# Patient Record
Sex: MALE | Race: BLACK OR AFRICAN AMERICAN | NOT HISPANIC OR LATINO | Employment: FULL TIME | URBAN - METROPOLITAN AREA
[De-identification: names, ages, dates, MRNs, and addresses within clinical notes are randomized per-mention and may not be internally consistent; named-entity substitution may affect disease eponyms.]

---

## 2020-10-06 ENCOUNTER — OFFICE VISIT (OUTPATIENT)
Dept: URGENT CARE | Facility: CLINIC | Age: 35
End: 2020-10-06
Payer: OTHER GOVERNMENT

## 2020-10-06 VITALS
HEIGHT: 71 IN | HEART RATE: 60 BPM | RESPIRATION RATE: 16 BRPM | TEMPERATURE: 97 F | OXYGEN SATURATION: 97 % | DIASTOLIC BLOOD PRESSURE: 73 MMHG | WEIGHT: 195 LBS | BODY MASS INDEX: 27.3 KG/M2 | SYSTOLIC BLOOD PRESSURE: 125 MMHG

## 2020-10-06 DIAGNOSIS — H10.502 BLEPHAROCONJUNCTIVITIS OF LEFT EYE, UNSPECIFIED BLEPHAROCONJUNCTIVITIS TYPE: Primary | ICD-10-CM

## 2020-10-06 PROCEDURE — 99204 PR OFFICE/OUTPT VISIT, NEW, LEVL IV, 45-59 MIN: ICD-10-PCS | Mod: S$GLB,,, | Performed by: NURSE PRACTITIONER

## 2020-10-06 PROCEDURE — 99204 OFFICE O/P NEW MOD 45 MIN: CPT | Mod: S$GLB,,, | Performed by: NURSE PRACTITIONER

## 2020-10-06 RX ORDER — POLYMYXIN B SULFATE AND TRIMETHOPRIM 1; 10000 MG/ML; [USP'U]/ML
1 SOLUTION OPHTHALMIC EVERY 6 HOURS
Qty: 10 ML | Refills: 0 | Status: SHIPPED | OUTPATIENT
Start: 2020-10-06 | End: 2020-10-06

## 2020-10-06 RX ORDER — POLYMYXIN B SULFATE AND TRIMETHOPRIM 1; 10000 MG/ML; [USP'U]/ML
1 SOLUTION OPHTHALMIC EVERY 6 HOURS
Qty: 10 ML | Refills: 0 | Status: SHIPPED | OUTPATIENT
Start: 2020-10-06 | End: 2020-10-13

## 2020-10-06 RX ORDER — OLOPATADINE HYDROCHLORIDE 1 MG/ML
1 SOLUTION/ DROPS OPHTHALMIC 2 TIMES DAILY
Qty: 5 ML | Refills: 1 | Status: SHIPPED | OUTPATIENT
Start: 2020-10-06 | End: 2021-10-06

## 2020-10-06 NOTE — PROGRESS NOTES
"Subjective:       Patient ID: Shekhar Casillas is a 35 y.o. male.    Vitals:  height is 5' 10.5" (1.791 m) and weight is 88.5 kg (195 lb). His oral temperature is 97.1 °F (36.2 °C). His blood pressure is 125/73 and his pulse is 60. His respiration is 16 and oxygen saturation is 97%.     Chief Complaint: Eye Pain    redness/swelling to top Lt eyelid X 1 wk    Eye Pain   The left eye is affected. This is a new problem. The current episode started in the past 7 days. There was no injury mechanism. He does not wear contacts. Pertinent negatives include no blurred vision, eye discharge, double vision, eye redness, fever, itching, nausea, photophobia or vomiting. He has tried eye drops for the symptoms.       Constitution: Negative for chills and fever.   HENT: Negative for congestion and sinus pain.    Eyes: Positive for eye pain and eyelid swelling. Negative for eye trauma, foreign body in eye, eye discharge, eye itching, eye redness, photophobia, vision loss, double vision and blurred vision.   Gastrointestinal: Negative for nausea and vomiting.   Skin: Negative for rash.   Allergic/Immunologic: Negative for seasonal allergies and itching.   Neurological: Negative for headaches.       Objective:      Physical Exam   Constitutional: He is oriented to person, place, and time. He appears well-developed.   HENT:   Head: Normocephalic and atraumatic.   Ears:   Right Ear: External ear normal.   Left Ear: External ear normal.   Nose: Nose normal.   Mouth/Throat: Oropharynx is clear and moist.   Eyes: Pupils are equal, round, and reactive to light. EOM and lids are normal. Lids are everted and swept, no foreign bodies found. Left eye exhibits chemosis. Left conjunctiva is injected.     vision grossly intactgaze aligned appropriately  Neck: Trachea normal, full passive range of motion without pain and phonation normal. Neck supple.   Musculoskeletal: Normal range of motion.   Neurological: He is alert and oriented to person, place, " and time.   Skin: Skin is warm, dry and intact. Psychiatric: His speech is normal and behavior is normal. Judgment and thought content normal.   Nursing note and vitals reviewed.        Assessment:       1. Blepharoconjunctivitis of left eye, unspecified blepharoconjunctivitis type        Plan:         Blepharoconjunctivitis of left eye, unspecified blepharoconjunctivitis type    Other orders  -     olopatadine (PATANOL) 0.1 % ophthalmic solution; Place 1 drop into both eyes 2 (two) times daily.  Dispense: 5 mL; Refill: 1  -     Discontinue: polymyxin B sulf-trimethoprim (POLYTRIM) 10,000 unit- 1 mg/mL Drop; Place 1 drop into the right eye every 6 (six) hours. for 7 days  Dispense: 10 mL; Refill: 0  -     polymyxin B sulf-trimethoprim (POLYTRIM) 10,000 unit- 1 mg/mL Drop; Place 1 drop into the left eye every 6 (six) hours. for 7 days  Dispense: 10 mL; Refill: 0

## 2020-10-06 NOTE — PATIENT INSTRUCTIONS
Blepharitis    Blepharitis is an inflammation of the eyelid. It results in swelling of the eyelids, and it is usually caused by a bacterial infection or a skin condition. Blepharitis is a common eye condition. There are two types. Anterior blepharitis occurs where the eyelashes are attached (outside front edge of the eye). Posterior blepharitis affects the inner edge of the eyelid that touches the eyeball.  In addition to swollen eyelids, symptoms of blepharitis can include thick, yellow, dandruff-like scales that stick to the eyelid. There may be oily patches on the eyelid. The eyelashes may be crusted (with dandruff-like scales) when you wake up from sleeping. The irritated area may itch. The eyelids may be red. The eyes can be red and burn or sting. The eyes may tear a lot, or be dry. You can become sensitive to light or have blurred vision. Symptoms of blepharitis can cause irritability.  Blepharitis is a chronic condition and difficult to cure. Even with successful treatment, recurrences are common. Good hygiene and home treatments (in the Home care section below) can improve your condition.  Causes  Causes of blepharitis may include:  · Problems with the oil glands in the eyelid (meibomian glands)  · Dandruff of the scalp and eyebrows (seborrheic dermatitis)  · Acne rosacea (a skin condition that causes redness of the face, and other symptoms)  · Eyelash mites (tiny organisms in the eyelash follicles)  · Allergic reactions to cosmetics or medicines  Home care  Medicine: The healthcare provider may prescribe an antibiotic eye drops or ointment, artificial tears, and/or steroid eye drops. Follow all instructions for using these medicines. Use all medicines as directed. If you have pain, take medicine as advised by the healthcare provider.  · Wash your hands carefully with soap and warm water before and after caring for your eyes.  · Apply a warm compress or a warm, moist washcloth to the eyelids for 1 minute,  2 to 3 times a day, to loosen the crust. Then, wipe away scales or crust from the eyelids.  · After applying the warm compress, gently scrub the base of the eyelashes for almost 15 seconds per eyelid. To do this, close your eyes and use a moist eyelid cleansing wipe, clean washcloth, or cotton swab. Ask your healthcare provider about products (such as nonirritating baby shampoo) to use to help clean the eyelids.  · You may be instructed to gently massage your eyelids to help unblock the eyelid glands. Follow all instructions given by the healthcare provider.  · Unless told otherwise, on a regular basis, with eyes closed, clean your eyelids as directed by the healthcare provider. Blepharitis can be an ongoing problem.  · Do not wear eye makeup until the inflammation goes away, or as directed by your healthcare provider.  · Unless told otherwise, stop using contact lenses until you complete treatment for the condition.  · Wash your hands regularly to help prevent dirt and bacteria from coming in contact with your eyelid.  Follow-up care  Follow up with your healthcare provider, or as advised. Your healthcare provider may refer you to an eye specialist (an optometrist or ophthalmologist) for further evaluation and treatment.  When to seek medical advice  Call your healthcare provider right away if any of these occur:  · Increase in redness of the white part of the eye  · Increase in swelling, redness, irritation, or pain of the eyelids  · Eye pain  · Change in vision (trouble seeing or blurring)  · Drainage (pus, blood) from the eyelid  · Fever of 100.4ºF (38ºC) or higher, or as directed by your healthcare provider  Date Last Reviewed: 10/9/2015  © 9408-7933 [employer]. [address] [place] [address]. All rights reserved. This information is not intended as a substitute for professional medical care. Always follow your healthcare professional's instructions.        Treating Blepharitis:  Self-Care    To treat the problem, keep your eyelids clean. Warm compresses can reduce redness and swelling, and help clean your eyelids, too. You may also need to wash the area gently with an eyelid scrub when you wake up.  To apply a warm compress:  1. Wash your hands with soap and warm water.  2. Wet a clean washcloth with warm water. Then wring it out.  3. Close your eyes and place the washcloth over your eyelids for 3 to 5 minutes. This helps loosen scales or crusts.  4. Wet the washcloth again as often as needed to keep it warm.  Repeat 2 or more times a day. Use a clean washcloth each time.     To use an eyelid scrub:  1. Wash your hands with soap and warm water.  2. Use a ready-made eyelid scrub. Or mix 3 drops of baby shampoo in 1/4 cup of warm water.  3. Dip a lint-free pad, cotton swab, or clean washcloth in the scrub.  4. Close one eye and gently scrub the base of the eyelid.  5. Rinse the lid in cool water and dry with a clean towel.  6. Repeat on your other eye.  Date Last Reviewed: 6/6/2015  © 4954-3649 [employer]. [address] [place] [address], [place] [address]. All rights reserved. This information is not intended as a substitute for professional medical care. Always follow your healthcare professional's instructions.        Conjunctivitis, Nonspecific    The membrane that covers the white part of your eye (the conjunctiva) is inflamed. Inflammation happens when your body responds to an injury, allergic reaction, infection, or illness. Symptoms of inflammation in the eye may include redness, irritation, itching, swelling, or burning. These symptoms should go away within the next 24 hours. Conjunctivitis may be related to a particle that was in your eye. If so, it may wash out with your tears or irrigation treatment. Being exposed to liquid chemicals or fumes may also cause this reaction.   Home care  · Apply a cold pack (ice in a plastic bag, wrapped in a towel) over the eye for 20 minutes  at a time. This will reduce pain.  · Artificial tears may be prescribed to reduce irritation or redness.  These should be used 3 to 4 times a day.  · You may use acetaminophen or ibuprofen to control pain, unless another medicine was prescribed.(Note: If you have chronic liver or kidney disease, or if you have ever had a stomach ulcer or gastrointestinal bleeding, talk with your healthcare provider before using these medicines.)  Follow-up care  Follow up with your healthcare provider, or as advised.  When to seek medical advice  Call your healthcare provider right away if any of these occur:  · Increased eyelid swelling  · Increased eye pain  · Increased redness or drainage from the eye  · Increased blurry vision or increased sensitivity to light  · Failure of normal vision to return within 24 to 48 hours  Date Last Reviewed: 6/14/2015  © 7918-1503 [employer]. [address] [place] [address], [place] [address]. All rights reserved. This information is not intended as a substitute for professional medical care. Always follow your healthcare professional's instructions.

## 2020-10-09 ENCOUNTER — TELEPHONE (OUTPATIENT)
Dept: URGENT CARE | Facility: CLINIC | Age: 35
End: 2020-10-09